# Patient Record
Sex: FEMALE | Race: WHITE | NOT HISPANIC OR LATINO | URBAN - METROPOLITAN AREA
[De-identification: names, ages, dates, MRNs, and addresses within clinical notes are randomized per-mention and may not be internally consistent; named-entity substitution may affect disease eponyms.]

---
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
## 2021-05-15 VITALS
RESPIRATION RATE: 12 BRPM | HEART RATE: 87 BPM | WEIGHT: 171 LBS | SYSTOLIC BLOOD PRESSURE: 125 MMHG | HEIGHT: 68 IN | DIASTOLIC BLOOD PRESSURE: 80 MMHG | OXYGEN SATURATION: 100 % | BODY MASS INDEX: 25.91 KG/M2 | TEMPERATURE: 98.1 F

## 2021-05-16 VITALS
BODY MASS INDEX: 27.34 KG/M2 | DIASTOLIC BLOOD PRESSURE: 59 MMHG | TEMPERATURE: 97.9 F | SYSTOLIC BLOOD PRESSURE: 120 MMHG | HEIGHT: 67 IN | WEIGHT: 174.19 LBS | OXYGEN SATURATION: 98 % | RESPIRATION RATE: 18 BRPM | HEART RATE: 88 BPM

## 2021-05-16 VITALS
HEART RATE: 95 BPM | SYSTOLIC BLOOD PRESSURE: 108 MMHG | BODY MASS INDEX: 27.2 KG/M2 | DIASTOLIC BLOOD PRESSURE: 64 MMHG | HEIGHT: 67 IN | WEIGHT: 173.31 LBS | OXYGEN SATURATION: 98 % | RESPIRATION RATE: 18 BRPM | TEMPERATURE: 98 F

## 2021-08-10 ENCOUNTER — TELEPHONE (OUTPATIENT)
Dept: FAMILY MEDICINE CLINIC | Facility: CLINIC | Age: 21
End: 2021-08-10

## 2021-08-11 DIAGNOSIS — Z30.41 ENCOUNTER FOR SURVEILLANCE OF CONTRACEPTIVE PILLS: Primary | ICD-10-CM

## 2021-08-11 DIAGNOSIS — Z30.019 ENCOUNTER FOR PRESCRIPTION OF FEMALE CONTRACEPTIVES: ICD-10-CM

## 2021-08-11 RX ORDER — NORGESTIMATE AND ETHINYL ESTRADIOL 7DAYSX3 28
KIT ORAL
Qty: 28 TABLET | Refills: 12 | Status: SHIPPED | OUTPATIENT
Start: 2021-08-11

## 2021-09-07 RX ORDER — CETIRIZINE HYDROCHLORIDE 10 MG/1
TABLET ORAL
Qty: 90 TABLET | Refills: 0 | Status: SHIPPED | OUTPATIENT
Start: 2021-09-07 | End: 2021-11-29

## 2021-11-29 RX ORDER — CETIRIZINE HYDROCHLORIDE 10 MG/1
TABLET ORAL
Qty: 90 TABLET | Refills: 0 | Status: SHIPPED | OUTPATIENT
Start: 2021-11-29

## 2022-03-24 RX ORDER — CETIRIZINE HYDROCHLORIDE 10 MG/1
TABLET ORAL
Qty: 90 TABLET | Refills: 0 | OUTPATIENT
Start: 2022-03-24

## 2022-03-25 ENCOUNTER — TELEPHONE (OUTPATIENT)
Dept: FAMILY MEDICINE CLINIC | Facility: CLINIC | Age: 22
End: 2022-03-25

## 2022-03-25 NOTE — TELEPHONE ENCOUNTER
Informed pt that it has been three years since she was last seen and that she needs to be seen for medication refill. Offered to schedule visit, she declined and said she would find a provider closer to her home

## 2022-03-25 NOTE — TELEPHONE ENCOUNTER
Caller: Maylin Ochoa    Relationship: Self    Best call back number: [phone]    What is the best time to reach you: ANY    Who are you requesting to speak with (clinical staff, provider,  specific staff member): CLINICAL      What was the call regarding: PATIENT STATED:  cetirizine (zyrTEC) 10 MG tablet WAS DENIED AND WANTING TO KNOW WHY   [id number] - [place], [place] - [address] [place] [address] AT [address] [facility] [address] [facility] [phone] [facility] [phone] FX    Do you require a callback: YES

## 2022-07-07 DIAGNOSIS — Z30.019 ENCOUNTER FOR PRESCRIPTION OF FEMALE CONTRACEPTIVES: ICD-10-CM

## 2022-07-07 RX ORDER — NORGESTIMATE AND ETHINYL ESTRADIOL 7DAYSX3 28
KIT ORAL
Qty: 28 TABLET | Refills: 12 | OUTPATIENT
Start: 2022-07-07

## 2023-03-24 NOTE — TELEPHONE ENCOUNTER
Caller: Maylin Ochoa    Relationship: Self    Best call back number: [phone]    What form or medical record are you requesting: LETTER STATING THAT PATIENT IS CLAUSTROPHOBIC AND CAN NOT WEAR A MASK.    Who is requesting this form or medical record from you: EMPLOYER    How would you like to receive the form or medical records (pick-up, mail, fax): FAX  If fax, what is the fax number: [phone] [employer]  If mail, what is the address:   If pick-up, provide patient with address and location details    Timeframe paperwork needed: ASAP    Additional notes: PATIENT STATES THAT SHE NEEDS THIS PAPER BY TOMORROW            Dr. Hermon Meigs. Called asking if we have received the thyroid biopsy results back. I can print out the results for you if it is not in your box, but it is in the media tab in epic  on date 03/22/2023 as pathology result.